# Patient Record
Sex: FEMALE | Race: ASIAN | URBAN - METROPOLITAN AREA
[De-identification: names, ages, dates, MRNs, and addresses within clinical notes are randomized per-mention and may not be internally consistent; named-entity substitution may affect disease eponyms.]

---

## 2017-03-30 ENCOUNTER — DOCTOR'S OFFICE (OUTPATIENT)
Dept: URBAN - METROPOLITAN AREA CLINIC 135 | Facility: CLINIC | Age: 37
Setting detail: OPHTHALMOLOGY
End: 2017-03-30
Payer: COMMERCIAL

## 2017-03-30 DIAGNOSIS — H52.223: ICD-10-CM

## 2017-03-30 DIAGNOSIS — H52.13: ICD-10-CM

## 2017-03-30 DIAGNOSIS — H44.23: ICD-10-CM

## 2017-03-30 PROCEDURE — 92004 COMPRE OPH EXAM NEW PT 1/>: CPT | Performed by: OPTOMETRIST

## 2017-03-30 ASSESSMENT — REFRACTION_AUTOREFRACTION
OS_AXIS: 041
OS_SPHERE: -14.75
OD_SPHERE: -16.00
OD_AXIS: 153
OS_CYLINDER: -1.25
OD_CYLINDER: -0.75

## 2017-03-30 ASSESSMENT — REFRACTION_OUTSIDERX
OD_AXIS: 155
OS_VA3: 20/
OD_VA1: 20/50
OD_SPHERE: -17.50
OS_VA1: 20/40
OS_VA2: 20/20
OS_SPHERE: -17.25
OU_VA: 20/40
OD_CYLINDER: -0.75
OD_VA3: 20/
OS_CYLINDER: -1.00
OS_AXIS: 020
OD_VA2: 20/20

## 2017-03-30 ASSESSMENT — REFRACTION_CURRENTRX
OS_OVR_VA: 20/
OD_OVR_VA: 20/
OD_OVR_VA: 20/
OS_OVR_VA: 20/
OS_OVR_VA: 20/
OD_OVR_VA: 20/

## 2017-03-30 ASSESSMENT — REFRACTION_MANIFEST
OD_VA2: 20/
OS_VA3: 20/
OD_VA1: 20/
OS_VA3: 20/
OD_VA2: 20/
OD_VA1: 20/
OS_VA1: 20/
OU_VA: 20/
OS_VA1: 20/
OS_VA2: 20/
OD_VA3: 20/
OU_VA: 20/
OS_VA2: 20/
OD_VA3: 20/

## 2017-03-30 ASSESSMENT — TEAR BREAK UP TIME (TBUT)
OS_TBUT: 1+
OD_TBUT: 1+

## 2017-03-30 ASSESSMENT — CONFRONTATIONAL VISUAL FIELD TEST (CVF)
OS_FINDINGS: FULL
OD_FINDINGS: FULL

## 2017-03-30 ASSESSMENT — SPHEQUIV_DERIVED
OS_SPHEQUIV: -15.375
OD_SPHEQUIV: -16.375

## 2017-03-30 ASSESSMENT — VISUAL ACUITY
OS_BCVA: 20/70
OD_BCVA: 20/150

## 2017-03-30 ASSESSMENT — SUPERFICIAL PUNCTATE KERATITIS (SPK)
OS_SPK: T
OD_SPK: T

## 2017-05-18 ENCOUNTER — DOCTOR'S OFFICE (OUTPATIENT)
Dept: URBAN - METROPOLITAN AREA CLINIC 135 | Facility: CLINIC | Age: 37
Setting detail: OPHTHALMOLOGY
End: 2017-05-18
Payer: COMMERCIAL

## 2017-05-18 DIAGNOSIS — H52.223: ICD-10-CM

## 2017-05-18 DIAGNOSIS — H52.13: ICD-10-CM

## 2017-05-18 PROCEDURE — 92310 CONTACT LENS FITTING OU: CPT | Performed by: OPTOMETRIST

## 2017-05-18 ASSESSMENT — REFRACTION_AUTOREFRACTION
OS_AXIS: 041
OD_CYLINDER: -0.75
OD_AXIS: 153
OS_SPHERE: -14.75
OD_SPHERE: -16.00
OS_CYLINDER: -1.25

## 2017-05-18 ASSESSMENT — REFRACTION_OUTSIDERX
OU_VA: 20/40
OD_CYLINDER: -0.75
OS_VA1: 20/40
OD_VA1: 20/50
OS_CYLINDER: -1.00
OD_VA2: 20/20
OD_AXIS: 155
OS_VA2: 20/20
OS_VA3: 20/
OS_SPHERE: -17.25
OS_AXIS: 020
OD_SPHERE: -17.50
OD_VA3: 20/

## 2017-05-18 ASSESSMENT — REFRACTION_CURRENTRX
OD_OVR_VA: 20/
OS_OVR_VA: 20/
OD_OVR_VA: 20/
OD_OVR_VA: 20/
OS_OVR_VA: 20/
OS_OVR_VA: 20/

## 2017-05-18 ASSESSMENT — REFRACTION_MANIFEST
OD_VA3: 20/
OD_VA2: 20/
OD_VA2: 20/
OS_VA1: 20/
OS_VA2: 20/
OU_VA: 20/
OS_VA3: 20/
OS_VA2: 20/
OD_VA1: 20/
OD_VA3: 20/
OS_VA3: 20/
OS_VA1: 20/
OD_VA1: 20/
OU_VA: 20/

## 2017-05-18 ASSESSMENT — TEAR BREAK UP TIME (TBUT)
OS_TBUT: 1+
OD_TBUT: 1+

## 2017-05-18 ASSESSMENT — SUPERFICIAL PUNCTATE KERATITIS (SPK)
OD_SPK: T
OS_SPK: T

## 2017-05-18 ASSESSMENT — SPHEQUIV_DERIVED
OS_SPHEQUIV: -15.375
OD_SPHEQUIV: -16.375

## 2017-05-18 ASSESSMENT — VISUAL ACUITY
OD_BCVA: 20/40+2
OS_BCVA: 20/50+1

## 2017-10-30 LAB
ABO + RH BLD: NORMAL
D AG BLD QL: NORMAL
EXTERNAL RUBELLA IGG QUANTITATION: NORMAL
HBV SURFACE AG SER QL: NORMAL
HIV 1+2 AB+HIV1 P24 AG SERPL QL IA: NONREACTIVE
RPR SER QL: NORMAL

## 2017-12-27 LAB — GLUCOSE 1H P GLC SERPL-MCNC: 111 MG/DL (ref 51–180)

## 2018-03-06 ENCOUNTER — HOSPITAL ENCOUNTER (OUTPATIENT)
Dept: PERINATAL CARE | Facility: HOSPITAL | Age: 38
Discharge: HOME | End: 2018-03-06
Attending: OBSTETRICS & GYNECOLOGY
Payer: COMMERCIAL

## 2018-03-06 ENCOUNTER — TRANSCRIBE ORDERS (OUTPATIENT)
Dept: SCHEDULING | Age: 38
End: 2018-03-06

## 2018-03-06 DIAGNOSIS — O09.523 ELDERLY MULTIGRAVIDA IN THIRD TRIMESTER: ICD-10-CM

## 2018-03-06 DIAGNOSIS — Z36.9 PRENATAL SCREENING ENCOUNTER: Primary | ICD-10-CM

## 2018-03-06 DIAGNOSIS — O46.90 VAGINAL BLEEDING IN PREGNANCY: Primary | ICD-10-CM

## 2018-03-06 DIAGNOSIS — Z36.9 PRENATAL SCREENING ENCOUNTER: ICD-10-CM

## 2018-03-06 DIAGNOSIS — Z3A.32 32 WEEKS GESTATION OF PREGNANCY: ICD-10-CM

## 2018-03-06 PROCEDURE — 76817 TRANSVAGINAL US OBSTETRIC: CPT | Mod: TC

## 2018-03-06 PROCEDURE — 76805 OB US >/= 14 WKS SNGL FETUS: CPT | Mod: TC

## 2018-03-15 ENCOUNTER — HOSPITAL ENCOUNTER (OUTPATIENT)
Facility: HOSPITAL | Age: 38
Discharge: HOME | End: 2018-03-15
Attending: OBSTETRICS & GYNECOLOGY | Admitting: OBSTETRICS & GYNECOLOGY
Payer: COMMERCIAL

## 2018-03-15 VITALS
WEIGHT: 160 LBS | SYSTOLIC BLOOD PRESSURE: 106 MMHG | HEART RATE: 87 BPM | BODY MASS INDEX: 26.66 KG/M2 | DIASTOLIC BLOOD PRESSURE: 55 MMHG | TEMPERATURE: 98.3 F | HEIGHT: 65 IN

## 2018-03-15 PROBLEM — O47.00 PRETERM CONTRACTIONS: Status: ACTIVE | Noted: 2018-03-15

## 2018-03-15 PROBLEM — O46.90 VAGINAL BLEEDING IN PREGNANCY: Status: RESOLVED | Noted: 2018-03-06 | Resolved: 2018-03-15

## 2018-03-15 LAB
BACTERIA URNS QL MICRO: NORMAL /UL
BILIRUB UR QL STRIP.AUTO: NEGATIVE MG/DL
CLARITY UR REFRACT.AUTO: CLEAR
COLOR UR AUTO: YELLOW
GLUCOSE UR STRIP.AUTO-MCNC: NEGATIVE MG/DL
HGB UR QL STRIP.AUTO: NEGATIVE
HYALINE CASTS #/AREA URNS LPF: NORMAL /LPF
KETONES UR STRIP.AUTO-MCNC: NEGATIVE MG/DL
LEUKOCYTE ESTERASE UR QL STRIP.AUTO: NEGATIVE
NITRITE UR QL STRIP.AUTO: NEGATIVE
PH UR STRIP.AUTO: 6.5 [PH]
PROT UR QL STRIP.AUTO: NEGATIVE
RBC #/AREA URNS HPF: NORMAL /HPF
SP GR UR REFRACT.AUTO: <=1.005
SQUAMOUS URNS QL MICRO: NORMAL /HPF
UROBILINOGEN UR STRIP-ACNC: 0.2 EU/DL
WBC #/AREA URNS HPF: NORMAL /HPF

## 2018-03-15 PROCEDURE — 81003 URINALYSIS AUTO W/O SCOPE: CPT | Performed by: OBSTETRICS & GYNECOLOGY

## 2018-03-15 PROCEDURE — 87086 URINE CULTURE/COLONY COUNT: CPT | Performed by: OBSTETRICS & GYNECOLOGY

## 2018-03-15 PROCEDURE — 63600000 HC DRUGS/DETAIL CODE

## 2018-03-15 PROCEDURE — 63600000 HC DRUGS/DETAIL CODE: Performed by: OBSTETRICS & GYNECOLOGY

## 2018-03-15 RX ORDER — TERBUTALINE SULFATE 1 MG/ML
0.25 INJECTION SUBCUTANEOUS ONCE
Status: COMPLETED | OUTPATIENT
Start: 2018-03-15 | End: 2018-03-15

## 2018-03-15 RX ORDER — TERBUTALINE SULFATE 1 MG/ML
INJECTION SUBCUTANEOUS
Status: COMPLETED
Start: 2018-03-15 | End: 2018-03-15

## 2018-03-15 RX ADMIN — TERBUTALINE SULFATE 0.25 MG: 1 INJECTION SUBCUTANEOUS at 02:55

## 2018-03-15 RX ADMIN — TERBUTALINE SULFATE 0.25 MG: 1 INJECTION, SOLUTION SUBCUTANEOUS at 02:55

## 2018-03-15 RX ADMIN — TERBUTALINE SULFATE 0.25 MG: 1 INJECTION, SOLUTION SUBCUTANEOUS at 06:25

## 2018-03-15 RX ADMIN — SODIUM CHLORIDE, POTASSIUM CHLORIDE, SODIUM LACTATE AND CALCIUM CHLORIDE 1000 ML: 600; 310; 30; 20 INJECTION, SOLUTION INTRAVENOUS at 02:53

## 2018-03-15 NOTE — H&P
Obstetrics H&P     HPI      Ghazala Palomino is a 37 y.o. female  at 34w0d with an estimated due date of 2018, by Last Menstrual Period who presents with contractions every 4 to 7 minutes, di/di twins, cervix was 1 cm a weeks ago, not painful. Received 1 dose of sq Terb with contractions spacing out to 7 to 12 minutes and feeling the contractions less.      OB History:   Obstetric History       T2      L2     SAB1   TAB0   Ectopic0   Multiple0   Live Births2       # Outcome Date GA Lbr Godfrey/2nd Weight Sex Delivery Anes PTL Lv   4 Current            3 Term 08/21/15     CS-LTranv   CESARIO   2 SAB 06/15/14     SAB      1 Term 12     CS-LTranv   CESARIO      Complications: Maternal Fever          Medical History: No past medical history on file.    Surgical History: No past surgical history on file.    Social History:   Social History     Social History   • Marital status: Single     Spouse name: N/A   • Number of children: N/A   • Years of education: N/A     Social History Main Topics   • Smoking status: Never Smoker   • Smokeless tobacco: Never Used   • Alcohol use No   • Drug use: No   • Sexual activity: Not Asked     Other Topics Concern   • None     Social History Narrative   • None        Family History: No family history on file.    Allergies: Penicillins    Prior to Admission medications    Medication Sig Start Date End Date Taking? Authorizing Provider   PRENATAL VITS96/IRON FUM/FOLIC (PRE-RISHI VITAMIN) 27 mg iron- 800 mcg tablet Take 1 tablet by mouth daily.   Yes Historical Provider, MD       Review of Systems  Pertinent items are noted in HPI.    Objective     Vital Signs for the last 24 hours:  Temp:  [36.9 °C (98.5 °F)] 36.9 °C (98.5 °F)  Heart Rate:  [77-88] 88  BP: ()/(54-58) 99/54    Latest cervical exam:  Cervical Dilation (cm): 1  Cervical Effacement: 60  Fetal Station: -2  Method: sterile exam per RN (03/15/18 7350)          Fetal Monitoring:  FHR Baseline: normal  FHR  Variability: moderate  FHR Accelerations: present  FHR Decelerations: absent    Contraction Frequency: q 7 to 12 minutes      Labs:  No results found for: ABO, LABRH, RUBELLAIGGQT, GBS   Urine analysis nl    Assessment/Plan     Ghazala Palomino is a 37 y.o. female  at 34w0d admitted for  contractions,  prior caesarian section    1) FHR: Category I  2) GBS: non applicable  3) 2nd dose of sq Terb    Zaina Victor MD  3/15/2018  6:48 AM

## 2018-03-15 NOTE — DISCHARGE INSTRUCTIONS
Discharge Instructions    Call the office if you experience:    · Your bag of water breaks.  · Your baby stops moving.  · You have contractions or severe cramping every 5-7 minutes.   · You have any bleeding from your vagina.  · You have a low, dull backache. The pain may be felt in your lower back, or move to  · your sides or front.  · You have a fever higher than 100.4º.   · You have persistent headaches despite trying tylenol, spots in your vision, abdominal pain    Unless otherwise specified, keep your scheduled appointment. The office phone number is [phone].

## 2018-03-16 LAB — BACTERIA UR CULT: NORMAL

## 2018-03-28 LAB — GP B STREP SPEC QL CULT: NO GROWTH

## 2018-04-07 ENCOUNTER — HOSPITAL ENCOUNTER (OUTPATIENT)
Facility: HOSPITAL | Age: 38
Discharge: HOME | End: 2018-04-08
Attending: OBSTETRICS & GYNECOLOGY | Admitting: OBSTETRICS & GYNECOLOGY
Payer: COMMERCIAL

## 2018-04-08 VITALS
WEIGHT: 165 LBS | HEIGHT: 65 IN | TEMPERATURE: 98.4 F | RESPIRATION RATE: 16 BRPM | SYSTOLIC BLOOD PRESSURE: 97 MMHG | BODY MASS INDEX: 27.49 KG/M2 | HEART RATE: 84 BPM | DIASTOLIC BLOOD PRESSURE: 54 MMHG

## 2018-04-08 PROCEDURE — 63600000 HC DRUGS/DETAIL CODE: Performed by: OBSTETRICS & GYNECOLOGY

## 2018-04-08 PROCEDURE — 59025 FETAL NON-STRESS TEST: CPT

## 2018-04-08 RX ORDER — SODIUM CHLORIDE, SODIUM LACTATE, POTASSIUM CHLORIDE, CALCIUM CHLORIDE 600; 310; 30; 20 MG/100ML; MG/100ML; MG/100ML; MG/100ML
INJECTION, SOLUTION INTRAVENOUS CONTINUOUS
Status: DISCONTINUED | OUTPATIENT
Start: 2018-04-08 | End: 2018-04-08 | Stop reason: HOSPADM

## 2018-04-08 RX ORDER — TERBUTALINE SULFATE 1 MG/ML
0.25 INJECTION SUBCUTANEOUS ONCE
Status: COMPLETED | OUTPATIENT
Start: 2018-04-08 | End: 2018-04-08

## 2018-04-08 RX ADMIN — SODIUM CHLORIDE, POTASSIUM CHLORIDE, SODIUM LACTATE AND CALCIUM CHLORIDE: 600; 310; 30; 20 INJECTION, SOLUTION INTRAVENOUS at 00:54

## 2018-04-08 RX ADMIN — TERBUTALINE SULFATE 0.25 MG: 1 INJECTION, SOLUTION SUBCUTANEOUS at 03:06

## 2018-04-08 RX ADMIN — TERBUTALINE SULFATE 0.25 MG: 1 INJECTION, SOLUTION SUBCUTANEOUS at 00:14

## 2018-04-08 RX ADMIN — SODIUM CHLORIDE, POTASSIUM CHLORIDE, SODIUM LACTATE AND CALCIUM CHLORIDE 1000 ML: 600; 310; 30; 20 INJECTION, SOLUTION INTRAVENOUS at 00:14

## 2018-04-08 NOTE — NURSING NOTE
Spoke to DR Victor. Pt may have another dose of terbutaline now if she feels contractions again. Pt may be discharged to home when contractions space apart and pt comfortable.

## 2018-04-08 NOTE — NURSING NOTE
Pt was sleeping comfortable, awakened when nurse entered room. Contractions spaced out since she received her second dose of terbutaline. Pt given option to stay in hospital until morning or go home now since she is comfortable. Pt opt to be discharged now. IV d/c'd (pt received a total of 2 liters of LR).   Pt notified to call MD if contractions become more frequent or more intense again, or if water breaks. Pt verblized understanding.

## 2018-04-11 ENCOUNTER — ANESTHESIA EVENT (INPATIENT)
Dept: OBSTETRICS AND GYNECOLOGY | Facility: HOSPITAL | Age: 38
End: 2018-04-11
Payer: COMMERCIAL

## 2018-04-11 ENCOUNTER — HOSPITAL ENCOUNTER (INPATIENT)
Facility: HOSPITAL | Age: 38
LOS: 3 days | Discharge: HOME | End: 2018-04-14
Attending: OBSTETRICS & GYNECOLOGY | Admitting: OBSTETRICS & GYNECOLOGY
Payer: COMMERCIAL

## 2018-04-11 ENCOUNTER — ANESTHESIA (INPATIENT)
Dept: OBSTETRICS AND GYNECOLOGY | Facility: HOSPITAL | Age: 38
End: 2018-04-11
Payer: COMMERCIAL

## 2018-04-11 DIAGNOSIS — Z01.818 TUBAL LIGATION EVALUATION: ICD-10-CM

## 2018-04-11 DIAGNOSIS — O34.219 PREVIOUS CESAREAN DELIVERY AFFECTING PREGNANCY: Primary | ICD-10-CM

## 2018-04-11 PROBLEM — O47.00 PRETERM CONTRACTIONS: Status: RESOLVED | Noted: 2018-03-15 | Resolved: 2018-04-11

## 2018-04-11 PROBLEM — O09.523 ELDERLY MULTIGRAVIDA IN THIRD TRIMESTER: Status: RESOLVED | Noted: 2018-03-06 | Resolved: 2018-04-11

## 2018-04-11 PROBLEM — Z3A.32 32 WEEKS GESTATION OF PREGNANCY: Status: RESOLVED | Noted: 2018-03-06 | Resolved: 2018-04-11

## 2018-04-11 PROBLEM — Z98.51 S/P TUBAL LIGATION: Status: ACTIVE | Noted: 2018-04-11

## 2018-04-11 LAB
ABO + RH BLD: NORMAL
ALBUMIN SERPL-MCNC: 2.9 G/DL (ref 3.4–5)
ALP SERPL-CCNC: 185 IU/L (ref 35–126)
ALT SERPL-CCNC: 15 IU/L (ref 11–54)
AST SERPL-CCNC: 24 IU/L (ref 15–41)
BASOPHILS # BLD: 0.03 K/UL (ref 0.01–0.1)
BASOPHILS NFR BLD: 0.4 %
BILIRUB DIRECT SERPL-MCNC: <0.1 MG/DL
BILIRUB SERPL-MCNC: 0.4 MG/DL (ref 0.3–1.2)
BLD GP AB SCN SERPL QL: NEGATIVE
D AG BLD QL: POSITIVE
DIFFERENTIAL METHOD BLD: NORMAL
EOSINOPHIL # BLD: 0.13 K/UL (ref 0.04–0.36)
EOSINOPHIL NFR BLD: 1.6 %
ERYTHROCYTE [DISTWIDTH] IN BLOOD BY AUTOMATED COUNT: 13.7 % (ref 11.7–14.4)
HCT VFR BLDCO AUTO: 33.4 % (ref 35–45)
HGB BLD-MCNC: 10.3 G/DL (ref 11.8–15.7)
IMM GRANULOCYTES # BLD AUTO: 0.07 K/UL (ref 0–0.08)
IMM GRANULOCYTES NFR BLD AUTO: 0.8 %
LABORATORY COMMENT REPORT: NORMAL
LYMPHOCYTES # BLD: 1.73 K/UL (ref 1.2–3.5)
LYMPHOCYTES NFR BLD: 20.7 %
MCH RBC QN AUTO: 24.8 PG (ref 28–33.2)
MCHC RBC AUTO-ENTMCNC: 30.8 G/DL (ref 32.2–35.5)
MCV RBC AUTO: 80.5 FL (ref 83–98)
MONOCYTES # BLD: 0.65 K/UL (ref 0.28–0.8)
MONOCYTES NFR BLD: 7.8 %
NEUTROPHILS # BLD: 5.75 K/UL (ref 1.7–7)
NEUTS SEG NFR BLD: 68.7 %
NRBC BLD-RTO: 0 %
PDW BLD AUTO: 11.7 FL (ref 9.4–12.3)
PLATELET # BLD AUTO: 212 K/UL (ref 150–369)
PROT SERPL-MCNC: 5.9 G/DL (ref 6–8.2)
RBC # BLD AUTO: 4.15 M/UL (ref 3.93–5.22)
RPR SER QL: NORMAL
WBC # BLD AUTO: 8.36 K/UL (ref 3.8–10.5)

## 2018-04-11 PROCEDURE — 63600000 HC DRUGS/DETAIL CODE: Performed by: NURSE ANESTHETIST, CERTIFIED REGISTERED

## 2018-04-11 PROCEDURE — 12000000 HC ROOM AND CARE MED/SURG

## 2018-04-11 PROCEDURE — 63600000 HC DRUGS/DETAIL CODE: Performed by: OBSTETRICS & GYNECOLOGY

## 2018-04-11 PROCEDURE — 63600000 HC DRUGS/DETAIL CODE

## 2018-04-11 PROCEDURE — 25000000 HC PHARMACY GENERAL: Performed by: OBSTETRICS & GYNECOLOGY

## 2018-04-11 PROCEDURE — 71000001 HC PACU PHASE 1 INITIAL 30MIN: Performed by: OBSTETRICS & GYNECOLOGY

## 2018-04-11 PROCEDURE — 0UB70ZZ EXCISION OF BILATERAL FALLOPIAN TUBES, OPEN APPROACH: ICD-10-PCS | Performed by: OBSTETRICS & GYNECOLOGY

## 2018-04-11 PROCEDURE — 25000000 HC PHARMACY GENERAL: Performed by: NURSE ANESTHETIST, CERTIFIED REGISTERED

## 2018-04-11 PROCEDURE — 86850 RBC ANTIBODY SCREEN: CPT

## 2018-04-11 PROCEDURE — 85025 COMPLETE CBC W/AUTO DIFF WBC: CPT | Performed by: OBSTETRICS & GYNECOLOGY

## 2018-04-11 PROCEDURE — 37000010 ANESTHESIA SPINAL BLOCK: Performed by: ANESTHESIOLOGY

## 2018-04-11 PROCEDURE — 80076 HEPATIC FUNCTION PANEL: CPT | Performed by: OBSTETRICS & GYNECOLOGY

## 2018-04-11 PROCEDURE — 37000010 HC ANESTHESIA SPINAL: Performed by: OBSTETRICS & GYNECOLOGY

## 2018-04-11 PROCEDURE — 88302 TISSUE EXAM BY PATHOLOGIST: CPT | Performed by: OBSTETRICS & GYNECOLOGY

## 2018-04-11 PROCEDURE — 86592 SYPHILIS TEST NON-TREP QUAL: CPT | Performed by: OBSTETRICS & GYNECOLOGY

## 2018-04-11 PROCEDURE — 72000021 HC C SECTION LEVEL 1: Performed by: OBSTETRICS & GYNECOLOGY

## 2018-04-11 PROCEDURE — 71000011 HC PACU PHASE 1 EA ADDL MIN: Performed by: OBSTETRICS & GYNECOLOGY

## 2018-04-11 PROCEDURE — 63700000 HC SELF-ADMINISTRABLE DRUG: Performed by: OBSTETRICS & GYNECOLOGY

## 2018-04-11 PROCEDURE — 36415 COLL VENOUS BLD VENIPUNCTURE: CPT | Performed by: OBSTETRICS & GYNECOLOGY

## 2018-04-11 PROCEDURE — 63700000 HC SELF-ADMINISTRABLE DRUG

## 2018-04-11 RX ORDER — CALCIUM CARBONATE 200(500)MG
500 TABLET,CHEWABLE ORAL EVERY 4 HOURS PRN
Status: DISCONTINUED | OUTPATIENT
Start: 2018-04-11 | End: 2018-04-14 | Stop reason: HOSPADM

## 2018-04-11 RX ORDER — DIBUCAINE 1 %
1 OINTMENT (GRAM) TOPICAL AS NEEDED
Status: DISCONTINUED | OUTPATIENT
Start: 2018-04-11 | End: 2018-04-14 | Stop reason: HOSPADM

## 2018-04-11 RX ORDER — CEFAZOLIN SODIUM/WATER 1 G/10 ML
SYRINGE (ML) INTRAVENOUS
Status: COMPLETED
Start: 2018-04-11 | End: 2018-04-11

## 2018-04-11 RX ORDER — AMOXICILLIN 250 MG
1 CAPSULE ORAL 2 TIMES DAILY
Status: DISCONTINUED | OUTPATIENT
Start: 2018-04-11 | End: 2018-04-14 | Stop reason: HOSPADM

## 2018-04-11 RX ORDER — ONDANSETRON 8 MG/1
TABLET, ORALLY DISINTEGRATING ORAL
Status: COMPLETED
Start: 2018-04-11 | End: 2018-04-11

## 2018-04-11 RX ORDER — OXYTOCIN/RINGER'S LACTATE 20/1000 ML
125 PLASTIC BAG, INJECTION (ML) INTRAVENOUS CONTINUOUS
Status: DISPENSED | OUTPATIENT
Start: 2018-04-11 | End: 2018-04-11

## 2018-04-11 RX ORDER — BUPIVACAINE HYDROCHLORIDE 7.5 MG/ML
INJECTION, SOLUTION INTRASPINAL AS NEEDED
Status: DISCONTINUED | OUTPATIENT
Start: 2018-04-11 | End: 2018-04-11 | Stop reason: SURG

## 2018-04-11 RX ORDER — ONDANSETRON 4 MG/1
4 TABLET, ORALLY DISINTEGRATING ORAL EVERY 6 HOURS PRN
Status: DISCONTINUED | OUTPATIENT
Start: 2018-04-11 | End: 2018-04-14 | Stop reason: HOSPADM

## 2018-04-11 RX ORDER — ACETAMINOPHEN 325 MG/1
650 TABLET ORAL EVERY 4 HOURS PRN
Status: DISCONTINUED | OUTPATIENT
Start: 2018-04-11 | End: 2018-04-11

## 2018-04-11 RX ORDER — KETOROLAC TROMETHAMINE 30 MG/ML
30 INJECTION, SOLUTION INTRAMUSCULAR; INTRAVENOUS
Status: COMPLETED | OUTPATIENT
Start: 2018-04-11 | End: 2018-04-13

## 2018-04-11 RX ORDER — SODIUM CITRATE AND CITRIC ACID MONOHYDRATE 334; 500 MG/5ML; MG/5ML
SOLUTION ORAL
Status: COMPLETED
Start: 2018-04-11 | End: 2018-04-11

## 2018-04-11 RX ORDER — DIPHENHYDRAMINE HYDROCHLORIDE 50 MG/ML
12.5 INJECTION INTRAMUSCULAR; INTRAVENOUS EVERY 6 HOURS PRN
Status: DISCONTINUED | OUTPATIENT
Start: 2018-04-11 | End: 2018-04-14 | Stop reason: HOSPADM

## 2018-04-11 RX ORDER — LANOLIN
1 WAX (GRAM) MISCELLANEOUS AS NEEDED
Status: DISCONTINUED | OUTPATIENT
Start: 2018-04-11 | End: 2018-04-11 | Stop reason: SDUPTHER

## 2018-04-11 RX ORDER — NALOXONE HYDROCHLORIDE 0.4 MG/ML
0.4 INJECTION, SOLUTION INTRAMUSCULAR; INTRAVENOUS; SUBCUTANEOUS AS NEEDED
Status: ACTIVE | OUTPATIENT
Start: 2018-04-11 | End: 2018-04-12

## 2018-04-11 RX ORDER — ACETAMINOPHEN 325 MG/1
TABLET ORAL
Status: DISPENSED
Start: 2018-04-11 | End: 2018-04-11

## 2018-04-11 RX ORDER — PROCHLORPERAZINE EDISYLATE 5 MG/ML
10 INJECTION INTRAMUSCULAR; INTRAVENOUS EVERY 6 HOURS PRN
Status: DISCONTINUED | OUTPATIENT
Start: 2018-04-11 | End: 2018-04-14 | Stop reason: HOSPADM

## 2018-04-11 RX ORDER — SODIUM CHLORIDE, SODIUM LACTATE, POTASSIUM CHLORIDE, CALCIUM CHLORIDE 600; 310; 30; 20 MG/100ML; MG/100ML; MG/100ML; MG/100ML
150 INJECTION, SOLUTION INTRAVENOUS CONTINUOUS
Status: DISCONTINUED | OUTPATIENT
Start: 2018-04-11 | End: 2018-04-11

## 2018-04-11 RX ORDER — OXYCODONE HYDROCHLORIDE 5 MG/1
5 TABLET ORAL EVERY 4 HOURS PRN
Status: DISCONTINUED | OUTPATIENT
Start: 2018-04-11 | End: 2018-04-14 | Stop reason: HOSPADM

## 2018-04-11 RX ORDER — TERBUTALINE SULFATE 1 MG/ML
0.25 INJECTION SUBCUTANEOUS ONCE
Status: COMPLETED | OUTPATIENT
Start: 2018-04-11 | End: 2018-04-11

## 2018-04-11 RX ORDER — MORPHINE SULFATE 0.5 MG/ML
INJECTION, SOLUTION EPIDURAL; INTRATHECAL; INTRAVENOUS AS NEEDED
Status: DISCONTINUED | OUTPATIENT
Start: 2018-04-11 | End: 2018-04-11 | Stop reason: SURG

## 2018-04-11 RX ORDER — ACETAMINOPHEN 325 MG/1
975 TABLET ORAL EVERY 6 HOURS
Status: DISCONTINUED | OUTPATIENT
Start: 2018-04-11 | End: 2018-04-14 | Stop reason: HOSPADM

## 2018-04-11 RX ORDER — SODIUM CHLORIDE, SODIUM LACTATE, POTASSIUM CHLORIDE, CALCIUM CHLORIDE 600; 310; 30; 20 MG/100ML; MG/100ML; MG/100ML; MG/100ML
INJECTION, SOLUTION INTRAVENOUS CONTINUOUS
Status: DISCONTINUED | OUTPATIENT
Start: 2018-04-11 | End: 2018-04-11

## 2018-04-11 RX ORDER — PHENYLEPHRINE HYDROCHLORIDE 10 MG/ML
INJECTION INTRAVENOUS AS NEEDED
Status: DISCONTINUED | OUTPATIENT
Start: 2018-04-11 | End: 2018-04-11 | Stop reason: SURG

## 2018-04-11 RX ORDER — NALOXONE HYDROCHLORIDE 0.4 MG/ML
0.1 INJECTION, SOLUTION INTRAMUSCULAR; INTRAVENOUS; SUBCUTANEOUS
Status: DISCONTINUED | OUTPATIENT
Start: 2018-04-11 | End: 2018-04-14 | Stop reason: HOSPADM

## 2018-04-11 RX ORDER — DIPHENHYDRAMINE HCL 25 MG
25 CAPSULE ORAL EVERY 6 HOURS PRN
Status: DISCONTINUED | OUTPATIENT
Start: 2018-04-11 | End: 2018-04-14 | Stop reason: HOSPADM

## 2018-04-11 RX ORDER — ONDANSETRON HYDROCHLORIDE 2 MG/ML
4 INJECTION, SOLUTION INTRAVENOUS EVERY 6 HOURS PRN
Status: DISCONTINUED | OUTPATIENT
Start: 2018-07-10 | End: 2018-04-14 | Stop reason: HOSPADM

## 2018-04-11 RX ORDER — SODIUM CITRATE AND CITRIC ACID MONOHYDRATE 334; 500 MG/5ML; MG/5ML
30 SOLUTION ORAL ONCE
Status: COMPLETED | OUTPATIENT
Start: 2018-04-11 | End: 2018-04-11

## 2018-04-11 RX ORDER — ONDANSETRON HYDROCHLORIDE 2 MG/ML
INJECTION, SOLUTION INTRAVENOUS AS NEEDED
Status: DISCONTINUED | OUTPATIENT
Start: 2018-04-11 | End: 2018-04-11 | Stop reason: SURG

## 2018-04-11 RX ORDER — OXYTOCIN/RINGER'S LACTATE 20/1000 ML
PLASTIC BAG, INJECTION (ML) INTRAVENOUS AS NEEDED
Status: DISCONTINUED | OUTPATIENT
Start: 2018-04-11 | End: 2018-04-11 | Stop reason: SURG

## 2018-04-11 RX ORDER — LANOLIN
1 WAX (GRAM) MISCELLANEOUS AS NEEDED
Status: DISCONTINUED | OUTPATIENT
Start: 2018-04-11 | End: 2018-04-14 | Stop reason: HOSPADM

## 2018-04-11 RX ORDER — CEFAZOLIN SODIUM/WATER 1 G/10 ML
2 SYRINGE (ML) INTRAVENOUS
Status: DISCONTINUED | OUTPATIENT
Start: 2018-04-11 | End: 2018-04-11 | Stop reason: HOSPADM

## 2018-04-11 RX ORDER — ONDANSETRON 8 MG/1
8 TABLET, ORALLY DISINTEGRATING ORAL ONCE
Status: COMPLETED | OUTPATIENT
Start: 2018-04-11 | End: 2018-04-11

## 2018-04-11 RX ORDER — ACETAMINOPHEN 325 MG/1
975 TABLET ORAL EVERY 4 HOURS PRN
Status: DISCONTINUED | OUTPATIENT
Start: 2018-04-11 | End: 2018-04-11

## 2018-04-11 RX ORDER — ALUMINUM HYDROXIDE, MAGNESIUM HYDROXIDE, AND SIMETHICONE 1200; 120; 1200 MG/30ML; MG/30ML; MG/30ML
30 SUSPENSION ORAL EVERY 4 HOURS PRN
Status: DISCONTINUED | OUTPATIENT
Start: 2018-04-11 | End: 2018-04-14 | Stop reason: HOSPADM

## 2018-04-11 RX ORDER — SODIUM CHLORIDE, SODIUM LACTATE, POTASSIUM CHLORIDE, CALCIUM CHLORIDE 600; 310; 30; 20 MG/100ML; MG/100ML; MG/100ML; MG/100ML
125 INJECTION, SOLUTION INTRAVENOUS CONTINUOUS
Status: ACTIVE | OUTPATIENT
Start: 2018-04-11 | End: 2018-04-12

## 2018-04-11 RX ORDER — OXYTOCIN 10 [USP'U]/ML
INJECTION, SOLUTION INTRAMUSCULAR; INTRAVENOUS AS NEEDED
Status: DISCONTINUED | OUTPATIENT
Start: 2018-04-11 | End: 2018-04-11 | Stop reason: SURG

## 2018-04-11 RX ORDER — IBUPROFEN 600 MG/1
600 TABLET ORAL EVERY 6 HOURS PRN
Status: DISCONTINUED | OUTPATIENT
Start: 2018-04-13 | End: 2018-04-14 | Stop reason: HOSPADM

## 2018-04-11 RX ADMIN — KETOROLAC TROMETHAMINE 30 MG: 30 INJECTION, SOLUTION INTRAMUSCULAR; INTRAVENOUS at 10:34

## 2018-04-11 RX ADMIN — KETOROLAC TROMETHAMINE 30 MG: 30 INJECTION, SOLUTION INTRAMUSCULAR; INTRAVENOUS at 16:37

## 2018-04-11 RX ADMIN — SODIUM CITRATE AND CITRIC ACID MONOHYDRATE 30 ML: 500; 334 SOLUTION ORAL at 02:38

## 2018-04-11 RX ADMIN — SODIUM CHLORIDE, POTASSIUM CHLORIDE, SODIUM LACTATE AND CALCIUM CHLORIDE: 600; 310; 30; 20 INJECTION, SOLUTION INTRAVENOUS at 01:45

## 2018-04-11 RX ADMIN — TERBUTALINE SULFATE 0.25 MG: 1 INJECTION, SOLUTION SUBCUTANEOUS at 01:30

## 2018-04-11 RX ADMIN — SENNOSIDES AND DOCUSATE SODIUM 1 TABLET: 8.6; 5 TABLET ORAL at 20:20

## 2018-04-11 RX ADMIN — ONDANSETRON 8 MG: 8 TABLET, ORALLY DISINTEGRATING ORAL at 02:39

## 2018-04-11 RX ADMIN — PHENYLEPHRINE HYDROCHLORIDE 200 MCG: 10 INJECTION INTRAVENOUS at 03:33

## 2018-04-11 RX ADMIN — ACETAMINOPHEN 975 MG: 325 TABLET, FILM COATED ORAL at 16:36

## 2018-04-11 RX ADMIN — Medication 125 ML/HR: at 07:00

## 2018-04-11 RX ADMIN — PHENYLEPHRINE HYDROCHLORIDE 200 MCG: 10 INJECTION INTRAVENOUS at 03:28

## 2018-04-11 RX ADMIN — CEFAZOLIN SODIUM 2 MG: 100 INJECTION, SOLUTION INTRAVENOUS at 02:44

## 2018-04-11 RX ADMIN — SODIUM CITRATE AND CITRIC ACID MONOHYDRATE 30 ML: 334; 500 SOLUTION ORAL at 02:38

## 2018-04-11 RX ADMIN — KETOROLAC TROMETHAMINE 30 MG: 30 INJECTION, SOLUTION INTRAMUSCULAR; INTRAVENOUS at 22:21

## 2018-04-11 RX ADMIN — ONDANSETRON 4 MG: 2 INJECTION INTRAMUSCULAR; INTRAVENOUS at 03:11

## 2018-04-11 RX ADMIN — Medication 2 MG: at 02:44

## 2018-04-11 RX ADMIN — SODIUM CHLORIDE, POTASSIUM CHLORIDE, SODIUM LACTATE AND CALCIUM CHLORIDE: 600; 310; 30; 20 INJECTION, SOLUTION INTRAVENOUS at 01:33

## 2018-04-11 RX ADMIN — PRENATAL VIT W/ FE FUMARATE-FA TAB 27-0.8 MG 1 TABLET: 27-0.8 TAB at 08:53

## 2018-04-11 RX ADMIN — ONDANSETRON 2 MG: 2 INJECTION INTRAMUSCULAR; INTRAVENOUS at 03:05

## 2018-04-11 RX ADMIN — MORPHINE SULFATE 0.15 MG: 0.5 INJECTION, SOLUTION EPIDURAL; INTRATHECAL; INTRAVENOUS at 02:54

## 2018-04-11 RX ADMIN — ACETAMINOPHEN 975 MG: 325 TABLET, FILM COATED ORAL at 22:21

## 2018-04-11 RX ADMIN — SENNOSIDES AND DOCUSATE SODIUM 1 TABLET: 8.6; 5 TABLET ORAL at 08:53

## 2018-04-11 RX ADMIN — OXYTOCIN 5 UNITS: 10 INJECTION, SOLUTION INTRAMUSCULAR; INTRAVENOUS at 03:12

## 2018-04-11 RX ADMIN — PHENYLEPHRINE HYDROCHLORIDE 200 MCG: 10 INJECTION INTRAVENOUS at 03:23

## 2018-04-11 RX ADMIN — Medication 1000 ML: at 03:23

## 2018-04-11 RX ADMIN — Medication 1000 ML: at 03:12

## 2018-04-11 RX ADMIN — BUPIVACAINE HYDROCHLORIDE IN DEXTROSE 1.6 ML: 7.5 INJECTION, SOLUTION SUBARACHNOID at 02:54

## 2018-04-11 RX ADMIN — PHENYLEPHRINE HYDROCHLORIDE 200 MCG: 10 INJECTION INTRAVENOUS at 03:19

## 2018-04-11 RX ADMIN — SODIUM CHLORIDE, POTASSIUM CHLORIDE, SODIUM LACTATE AND CALCIUM CHLORIDE 125 ML/HR: 600; 310; 30; 20 INJECTION, SOLUTION INTRAVENOUS at 11:50

## 2018-04-11 RX ADMIN — KETOROLAC TROMETHAMINE 30 MG: 30 INJECTION, SOLUTION INTRAMUSCULAR; INTRAVENOUS at 04:25

## 2018-04-11 RX ADMIN — ACETAMINOPHEN 975 MG: 325 TABLET, FILM COATED ORAL at 10:34

## 2018-04-11 ASSESSMENT — PAIN SCALES - GENERAL: PAIN_LEVEL: 0

## 2018-04-11 NOTE — ANESTHESIA PROCEDURE NOTES
Spinal Block    Patient location during procedure: OB  Start time: 4/11/2018 2:55 AM  Staffing  Anesthesiologist: SHAI WING  Resident/CRNA: ALIREZA ALVAREZ  Preanesthetic Checklist  Completed: patient identified, surgical consent, pre-op evaluation, timeout performed, IV checked, risks and benefits discussed and monitors and equipment checked  Spinal Block  Patient position: sitting  Prep: ChloraPrep  Patient monitoring: continuous pulse ox  Approach: midline  Location: L3-4  Injection technique: single-shot  Needle  Needle type: Sprotte   Needle gauge: 24 G  Needle length: 3.5 in  Assessment  Sensory level: T4  Events: cerebrospinal fluid

## 2018-04-11 NOTE — H&P
Labor and Delivery Admission History and Physical    CC: Previous c section labor    History: Ghazala Palomino is a 37 y.o. female  at 37w6d with an estimated due date of 2018, by Last Menstrual Period who presents with contractions      Prenatal problems:  1. none    OB History:   Obstetric History       T2      L2     SAB1   TAB0   Ectopic0   Multiple0   Live Births2       # Outcome Date GA Lbr Godfrey/2nd Weight Sex Delivery Anes PTL Lv   4 Current            3 Term 08/21/15     CS-LTranv   CESARIO   2 SAB 06/15/14     SAB      1 Term 12     CS-LTranv   CESARIO      Complications: Maternal Fever          Medical History:   Past Medical History:   Diagnosis Date   • Infectious viral hepatitis     chronic hep B       Surgical History:   Past Surgical History:   Procedure Laterality Date   •  SECTION  12 and 8/21/15   • KNEE SURGERY Left        Social History:   Social History     Social History   • Marital status:      Spouse name: Abhijeet   • Number of children: N/A   • Years of education: N/A     Occupational History   • marketing      Social History Main Topics   • Smoking status: Never Smoker   • Smokeless tobacco: Never Used   • Alcohol use No   • Drug use: No   • Sexual activity: Yes     Other Topics Concern   • None     Social History Narrative   • None        Family History:   Family History   Problem Relation Age of Onset   • No Known Problems Paternal Grandfather    • No Known Problems Paternal Grandmother    • No Known Problems Maternal Grandmother    • No Known Problems Maternal Grandfather    • No Known Problems Father    • No Known Problems Mother    • No Known Problems Brother    • No Known Problems Sister        Allergies: Penicillins    Prior to Admission medications    Medication Sig Start Date End Date Taking? Authorizing Provider   PRENATAL VITS96/IRON FUM/FOLIC (PRE- VITAMIN) 27 mg iron- 800 mcg tablet Take 1 tablet by mouth daily.   Yes Historical  MD Julito       Review of Systems  Pertinent items are noted in HPI.    Objective     Vital Signs for the last 24 hours:  Temp:  [36.9 °C (98.5 °F)] 36.9 °C (98.5 °F)  Heart Rate:  [82] 82  Resp:  [16] 16  BP: (133)/(77) 133/77    Latest cervical exam:  Cervical Dilation (cm): 3-4  Cervical Effacement: 90  Fetal Station: -2  Method: sterile exam per RN (18 0057)      Fetal Monitoring:  EFM: baseline 140, mod hemal, pos accels, no decels  [place]: q3    Exam:  General Appearance: Alert, cooperative, no acute distress  Lungs: Unlabored breathing  Heart:: RRR  Abdomen: gravid, nontender  Extremities: no edema or calf tenderness  Neurologic: grossly intact without focal deficits    Cervix:4/-2    Ultrasounds:   I have reviewed the applicable Ultrasounds.      Labs:  See results console for full labs    Rubella: immune  Rh: pos  GBS: neg    Assessment/Plan     Ghazala Palomino is a 37 y.o. female  at 37w6d admitted for  labor     FHR: Category I  GBS: negative    Abigail Atkins MD

## 2018-04-11 NOTE — PLAN OF CARE
Problem: Patient Care Overview  Goal: Individualization & Mutuality  Outcome: Ongoing (interventions implemented as appropriate)

## 2018-04-11 NOTE — PLAN OF CARE
Problem: Patient Care Overview  Goal: Plan of Care Review  Outcome: Ongoing (interventions implemented as appropriate)   04/11/18 0651   Coping/Psychosocial   Plan Of Care Reviewed With patient   Plan of Care Review   Progress improving   Outcome Summary Pt pain is 0 out of 10     Goal: Individualization & Mutuality  Outcome: Ongoing (interventions implemented as appropriate)

## 2018-04-11 NOTE — OP NOTE
OB  Delivery OP Note    Date of Procedure: 2018  Patient:Ghazala Palomino  :1980    Procedures:    *  SECTION AND POST PARTUM TUBAL LIGATION  Review the Delivery Report for details.      Details    Pre-Op Diagnosis: 1. 37 y.o.  Intrauterine pregnancy at 37w6d  2. Labor  3. Desires permanent sterilization   Post-Op Diagnosis: 1. same   Procedure: Repeat  , Low Transverse  via   uterine incision and   skin incision.  Bilateral Mid segmental salpingectomy   Anesthesia:      Findings: Normal uterus, tubes, and ovaries.   EBL  1700 mL   Complications: None     Specimen Information:  ID Type Source Tests Collected by Time Destination   1 : right tube Tissue Fallopian Tube, Right PATHOLOGY TISSUE EXAM Abigail Atkins MD 2018 0323    2 : left tube Tissue Fallopian Tube, Left PATHOLOGY TISSUE EXAM Abigail Atkins MD 2018 0320      Drains: Riley draining clear    Staff:  Surgeon(s):  Abigail Atkins MD  Anesthesiologist: Rafal Logan MD  CRNA: Gay Ventura CRNA   Circulator: Earline Vásquez RN  Scrub Person: Ghazala Del Rio  Baby Nurse: Sally Jones RN  Other Staff:     Delivery Assist;Delivery Nurse     INFANT INFORMATION  Time of Birth:3:11 AM   Presentation:     Position:Right ,Occiput ,Transverse ,    Cord:   ,Complications:    Kincaid Sex: male   Kincaid Weight: 3.232 kg      1 Minute 5 Minute 10 Minute   Apgar Totals:                 Information for the patient's :  Neli Palomino [[id number]]      Cord Gas     None          Informed Consent:  An informed consent was obtained.    Procedure Details:  The patient was taken to the operating room where    anesthesia was placed and found to be adequate. Antibiotics were given for infection prophylaxis. The patient was prepped and draped in the normal sterile fashion.  A timeout was performed.  A Pfannenstiel skin incision was made with the scalpel. The  incision was carried down to the fascia using the bovie. The fascia was incised and this was extended laterally with the bovie. The fascia was grasped with Kocher clamps and the underlying rectus muscle was dissected off.  The rectus muscles were  bluntly. The peritoneum was identified and entered sharply. The peritoneum was dissected to allow for adequate visualization.    The bladder blade was inserted. The vesicouterine peritoneum was identified and a bladder flap created sharply. The lower uterine segment was then incised with a    incision and was extended bluntly. The amniotic sac was ruptured and    fluid noted. The bladder blade was removed and the infant's head was delivered atraumatically using the usual maneuvers. The remainder of the infant was delivered, a spontaneous cry was heard, and the infant appeared to be moving all 4 extremities. The cord clamped and cut, and the baby was handed off to the awaiting clinicians and neonatology staff.  The placenta was removed manually. The uterus was then exteriorized and cleared of all clots and debris. The hysterotomy was repaired with 0 PDS in a running locked fashion and good hemostasis observed. The ovaries and tubes appeared normal bilaterally. The left tube was grasped with a nathen. A hole was made in the mesosalpinx with bovie cautery.  A 2 cm segment of fallopian tube was tied off using a 2-0 chromic suture.  The tube was dissected using Metzenbaum scissors.  The tubal ends were cauterized using Bovie cautery.  The procedure was repeated on the right.  Suture came off of the distal segment on the right.  This was grasped with a Spurgeon.  Bleeding was noted.  The tube was retied using a 2-0 chromic suture.  We did several running locking sutures along the mesosalpinx.  Good hemostasis was noted.  The hysterotomy incision was reexamined and irrigated.  There was some bleeding from the right corner.  This was made hemostatic using an 0 Monocryl  suture.  Good hemostasis was noted at the hysterotomy site and the tubes were reinspected noted to be hemostatic.  The uterus was placed back into the abdomen.  The gutters were irrigated using moist laps.  The tubal segments were rechecked and noted to be hemostatic.  The hysterotomy was noted to be hemostatic.  The rectus muscle bellies were reapproximated with interrupted horizontal mattress sutures of 3-0 chromic.  The muscle bellies were noted to be hemostatic. .The fascia was then reapproximated with a running suture of 0 PDS.  Subcutaneous tissue was reapproximated using interrupted sutures of 3 oh plain gut.  The skin was closed with 4-0 Monocryl.    The patient tolerated the procedure well. The sponge, instrument, and needle counts were correct and the patient was taken to recovery in stable condition.    Attending Attestation: I was present and scrubbed for the entire procedure.    Abigail Atkins MD

## 2018-04-11 NOTE — PLAN OF CARE
Problem: Postpartum ( Delivery) (Adult,Obstetrics,Pediatric)  Goal: Anesthesia/Sedation Recovery  Outcome: Outcome(s) Achieved Date Met: 18

## 2018-04-11 NOTE — PLAN OF CARE
Problem: Pressure Ulcer Risk (Jose Scale) (Adult,Obstetrics,Pediatric)  Goal: Skin Integrity  Outcome: Ongoing (interventions implemented as appropriate)

## 2018-04-11 NOTE — ANESTHESIA POSTPROCEDURE EVALUATION
Patient: Ghazala Palomino    Procedure Summary     Date:  18 Room / Location:   L&D OR    Anesthesia Start:  246 Anesthesia Stop:  358    Procedure:   SECTION AND POST PARTUM TUBAL LIGATION (Bilateral Uterus) Diagnosis:  (Other (Add Comments))    Surgeon:  Abigail Atkins MD Responsible Provider:  Rafal Logan MD    Anesthesia Type:  spinal ASA Status:  2          Anesthesia Type: spinal  PACU Vitals     No data found.            Anesthesia Post Evaluation    Pain score: 0  Pain management: adequate  Patient location during evaluation: PACU  Patient participation: complete - patient participated  Level of consciousness: awake and alert  Cardiovascular status: acceptable  Respiratory status: acceptable  Hydration status: acceptable  Continue 24 hours observation after preservative free morphine administration: Yes  Anesthetic complications: no

## 2018-04-11 NOTE — ANESTHESIA PREPROCEDURE EVALUATION
Anesthesia ROS/MED HX    Anesthesia History    Previous anesthetics  No family history of anesthetic complications  No history of anesthetic complications  Pulmonary - neg  Neuro/Psych - neg  Cardiovascular- neg  GI/Hepatic   Hepatitis  Endo/Other   Liver disease   Body Habitus: Overweight      Relevant Problems   No active problems are marked relevant to this note.       Physical Exam    Airway   Mallampati: II   TM distance: >3 FB   Neck ROM: full  Cardiovascular - normal   Rhythm: regular   Rate: normal  Pulmonary - normal   clear to auscultation  Other Findings   Back - neg   landmarks identified          Anesthesia Plan    Plan: spinal    Technique: spinal   ASA 2  Blood Products:     Consented to blood products  Anesthetic plan and risks discussed with: spouse  Postop Plan:   Patient Disposition: inpatient floor planned admission

## 2018-04-11 NOTE — PLAN OF CARE
Problem: Patient Care Overview  Goal: Plan of Care Review  Outcome: Ongoing (interventions implemented as appropriate)   04/11/18 0651 04/11/18 7443   Coping/Psychosocial   Plan Of Care Reviewed With --  patient;spouse   Plan of Care Review   Progress --  improving   Outcome Summary Pt pain is 0 out of 10 --

## 2018-04-11 NOTE — NURSING NOTE
IV saline locked. SCD removed bilaterally. Patient assisted out of bed and ambulated to bathroom without any dizziness. Voided 450mL yellow urine. pericare provided. Pt then ambulated to rocking chair, where she sat to eat dinner.

## 2018-04-11 NOTE — PROGRESS NOTES
Patient: Ghazala Palomino  Procedure(s):   SECTION AND POST PARTUM TUBAL LIGATION  Patient location: Labor and Delivery    Last vitals:   Vitals:    18 0427   BP: (!) 91/44   Pulse: 80   Resp:    Temp:    SpO2:      Level of consciousness: Awake, Alert, Oriented  Post-anesthesia pain: Adequate analgesia  Anesthetic complications: None  Nausea and Vomiting Controled: Yes  Hydration: Adequate  Cardiovascular Function: Stable  Neuro Exam: WNL  Respiration: WNL  Pain is well controlled after spinal preservative free morphine administration and additional IV/PO meds:  Continue 24 hours observation after preservative free morphine administration:

## 2018-04-11 NOTE — PLAN OF CARE
Problem: Pressure Ulcer Risk (Jose Scale) (Adult,Obstetrics,Pediatric)  Goal: Identify Related Risk Factors and Signs and Symptoms  Outcome: Ongoing (interventions implemented as appropriate)

## 2018-04-12 LAB
CASE RPRT: NORMAL
CLINICAL INFO: NORMAL
ERYTHROCYTE [DISTWIDTH] IN BLOOD BY AUTOMATED COUNT: 14.1 % (ref 11.7–14.4)
HCT VFR BLDCO AUTO: 24.8 % (ref 35–45)
HGB BLD-MCNC: 7.8 G/DL (ref 11.8–15.7)
MCH RBC QN AUTO: 25.3 PG (ref 28–33.2)
MCHC RBC AUTO-ENTMCNC: 31.5 G/DL (ref 32.2–35.5)
MCV RBC AUTO: 80.5 FL (ref 83–98)
PATH REPORT.FINAL DX SPEC: NORMAL
PATH REPORT.GROSS SPEC: NORMAL
PDW BLD AUTO: 11.5 FL (ref 9.4–12.3)
PLATELET # BLD AUTO: 192 K/UL (ref 150–369)
RBC # BLD AUTO: 3.08 M/UL (ref 3.93–5.22)
WBC # BLD AUTO: 11.86 K/UL (ref 3.8–10.5)

## 2018-04-12 PROCEDURE — 12000000 HC ROOM AND CARE MED/SURG

## 2018-04-12 PROCEDURE — 85027 COMPLETE CBC AUTOMATED: CPT | Performed by: OBSTETRICS & GYNECOLOGY

## 2018-04-12 PROCEDURE — 63700000 HC SELF-ADMINISTRABLE DRUG: Performed by: OBSTETRICS & GYNECOLOGY

## 2018-04-12 PROCEDURE — 63600000 HC DRUGS/DETAIL CODE: Performed by: OBSTETRICS & GYNECOLOGY

## 2018-04-12 PROCEDURE — 36415 COLL VENOUS BLD VENIPUNCTURE: CPT | Performed by: OBSTETRICS & GYNECOLOGY

## 2018-04-12 RX ADMIN — KETOROLAC TROMETHAMINE 30 MG: 30 INJECTION, SOLUTION INTRAMUSCULAR; INTRAVENOUS at 04:25

## 2018-04-12 RX ADMIN — SENNOSIDES AND DOCUSATE SODIUM 1 TABLET: 8.6; 5 TABLET ORAL at 10:17

## 2018-04-12 RX ADMIN — KETOROLAC TROMETHAMINE 30 MG: 30 INJECTION, SOLUTION INTRAMUSCULAR; INTRAVENOUS at 22:38

## 2018-04-12 RX ADMIN — ACETAMINOPHEN 975 MG: 325 TABLET, FILM COATED ORAL at 16:23

## 2018-04-12 RX ADMIN — KETOROLAC TROMETHAMINE 30 MG: 30 INJECTION, SOLUTION INTRAMUSCULAR; INTRAVENOUS at 16:22

## 2018-04-12 RX ADMIN — SENNOSIDES AND DOCUSATE SODIUM 1 TABLET: 8.6; 5 TABLET ORAL at 20:05

## 2018-04-12 RX ADMIN — ACETAMINOPHEN 975 MG: 325 TABLET, FILM COATED ORAL at 04:24

## 2018-04-12 RX ADMIN — ACETAMINOPHEN 975 MG: 325 TABLET, FILM COATED ORAL at 10:22

## 2018-04-12 RX ADMIN — ACETAMINOPHEN 975 MG: 325 TABLET, FILM COATED ORAL at 22:38

## 2018-04-12 RX ADMIN — KETOROLAC TROMETHAMINE 30 MG: 30 INJECTION, SOLUTION INTRAMUSCULAR; INTRAVENOUS at 10:27

## 2018-04-12 RX ADMIN — PRENATAL VIT W/ FE FUMARATE-FA TAB 27-0.8 MG 1 TABLET: 27-0.8 TAB at 10:16

## 2018-04-12 NOTE — PROGRESS NOTES
Obstetrics Postpartum Progress Note  POD#1 s/p rLTCS    Events  No acute events overnight.    Subjective  Pain: Appropriate, controlled  Bleeding: lochia minimal  Diet: taking regular diet  Voiding: without difficulty  Bowel: passing flatus  Ambulating: as tolerated    Vitals  Temp:  [36.6 °C (97.8 °F)-37 °C (98.6 °F)] 36.9 °C (98.5 °F)  Heart Rate:  [70-88] 79  Resp:  [16-20] 16  BP: ()/(56-64) 94/56    I&O    Intake/Output Summary (Last 24 hours) at 18 0939  Last data filed at 18 1852   Gross per 24 hour   Intake              950 ml   Output             1450 ml   Net             -500 ml       Physical Exam  General: well  Lungs: Unlabored breathing  Abdomen: soft, minimally TTP without r/g. Min distension and tympany  Fundus: firm and below umbilicus  Incision: clean, dry, intact  Perineum: deferred  Extremities: no edema    Labs  Labs Reviewed:  Lab Results   Component Value Date    ABO A 2018    LABRH Positive 2018      Rubella: immune  Lab Results   Component Value Date    WBC 11.86 (H) 2018    HGB 7.8 (L) 2018    HCT 24.8 (L) 2018    MCV 80.5 (L) 2018     2018       Problem-based Assessment and Plan    Ghazala Palomino is a 37 y.o.  postop day 1 s/p RLTCS    1. Vital Signs: stable  2. Hemodynamics: stable, UOP is excellent. Asymptomatic anemia, started at 10. Iron on dc.  3. Pain: controlled  4. VTE Assessment: Early Ambulation, SCDs  5. Vaccinations/Rhogam: rhogam not indicated   6. Post care: meeting all goals     Plan: Routine care; anticipate discharge on POD#3    Dilcia Lomeli MD

## 2018-04-12 NOTE — LACTATION NOTE
4/12 P3, Mom reports baby is bf well but her will only nurse on one side a feeding and I told her that is perfectly normal and that may change as the appetite really kicks in, output and weight WNL.

## 2018-04-12 NOTE — ANESTHESIA PREPROCEDURE EVALUATION
Anesthesia ROS/MED HX    Anesthesia History - neg  Pulmonary - neg  Neuro/Psych - neg  Cardiovascular- neg  GI/Hepatic   Hepatitis  Musculoskeletal- neg  Endo/Other   Liver disease   Body Habitus: Overweight      Past Surgical History:   Procedure Laterality Date   •  SECTION  12 and 8/21/15   • KNEE SURGERY Left        Physical Exam    Airway   Mallampati: II   TM distance: >3 FB   Neck ROM: full  Cardiovascular - normal   Rhythm: regular   Rate: normal  Pulmonary - normal   clear to auscultation  Dental - normal        Anesthesia Plan    Plan: epidural    Technique: epidural   ASA 2  Blood Products:     Consented to blood products  Anesthetic plan and risks discussed with: patient  Postop Plan:   Patient Disposition: inpatient floor planned admission   Pain Management: IV analgesics

## 2018-04-12 NOTE — PLAN OF CARE
Problem: Patient Care Overview  Goal: Individualization & Mutuality  Outcome: Outcome(s) Achieved Date Met: 04/12/18

## 2018-04-12 NOTE — PLAN OF CARE
Problem: Patient Care Overview  Goal: Plan of Care Review  Outcome: Ongoing (interventions implemented as appropriate)   04/12/18 5910   Coping/Psychosocial   Plan Of Care Reviewed With patient   Plan of Care Review   Progress improving   Outcome Summary OOB ambulating,caring for and feeding infant independently

## 2018-04-12 NOTE — PLAN OF CARE
Problem: Patient Care Overview  Goal: Individualization & Mutuality  Outcome: Ongoing (interventions implemented as appropriate)   04/11/18 1742   Individualization   Patient Specific Preferences exclusively breastfeed   Patient Specific Goals breastfeed only   Patient Specific Interventions assist with positioning and latch-on.

## 2018-04-12 NOTE — ADDENDUM NOTE
Addendum  created 04/12/18 1326 by Rafal Logan MD    Anesthesia Review and Sign - Ready for Procedure, Anesthesia Review and Sign - Signed, Problem List reviewed, Sign clinical note

## 2018-04-12 NOTE — PLAN OF CARE
Problem: Pressure Ulcer Risk (Jose Scale) (Adult,Obstetrics,Pediatric)  Goal: Identify Related Risk Factors and Signs and Symptoms  Outcome: Outcome(s) Achieved Date Met: 04/12/18    Goal: Skin Integrity  Outcome: Outcome(s) Achieved Date Met: 04/12/18

## 2018-04-13 PROBLEM — O34.219 PREVIOUS CESAREAN DELIVERY AFFECTING PREGNANCY: Status: RESOLVED | Noted: 2018-04-08 | Resolved: 2018-04-13

## 2018-04-13 PROBLEM — O34.219 PREVIOUS CESAREAN DELIVERY AFFECTING PREGNANCY: Status: ACTIVE | Noted: 2018-04-08

## 2018-04-13 PROCEDURE — 63700000 HC SELF-ADMINISTRABLE DRUG: Performed by: OBSTETRICS & GYNECOLOGY

## 2018-04-13 PROCEDURE — 63600000 HC DRUGS/DETAIL CODE: Performed by: OBSTETRICS & GYNECOLOGY

## 2018-04-13 PROCEDURE — 12000000 HC ROOM AND CARE MED/SURG

## 2018-04-13 RX ORDER — ACETAMINOPHEN 325 MG/1
975 TABLET ORAL EVERY 6 HOURS
Qty: 360 TABLET | Refills: 0 | Status: SHIPPED | OUTPATIENT
Start: 2018-04-13 | End: 2018-05-13

## 2018-04-13 RX ORDER — IBUPROFEN 600 MG/1
600 TABLET ORAL EVERY 6 HOURS PRN
Start: 2018-04-13 | End: 2018-05-13

## 2018-04-13 RX ORDER — OXYCODONE HYDROCHLORIDE 5 MG/1
5 TABLET ORAL EVERY 4 HOURS PRN
Refills: 0
Start: 2018-04-13 | End: 2018-04-18

## 2018-04-13 RX ADMIN — ACETAMINOPHEN 975 MG: 325 TABLET, FILM COATED ORAL at 10:45

## 2018-04-13 RX ADMIN — ACETAMINOPHEN 975 MG: 325 TABLET, FILM COATED ORAL at 04:29

## 2018-04-13 RX ADMIN — IBUPROFEN 600 MG: 600 TABLET ORAL at 16:44

## 2018-04-13 RX ADMIN — PRENATAL VIT W/ FE FUMARATE-FA TAB 27-0.8 MG 1 TABLET: 27-0.8 TAB at 09:23

## 2018-04-13 RX ADMIN — KETOROLAC TROMETHAMINE 30 MG: 30 INJECTION, SOLUTION INTRAMUSCULAR; INTRAVENOUS at 04:30

## 2018-04-13 RX ADMIN — IBUPROFEN 600 MG: 600 TABLET ORAL at 22:40

## 2018-04-13 RX ADMIN — IBUPROFEN 600 MG: 600 TABLET ORAL at 10:45

## 2018-04-13 RX ADMIN — ACETAMINOPHEN 975 MG: 325 TABLET, FILM COATED ORAL at 16:45

## 2018-04-13 RX ADMIN — SENNOSIDES AND DOCUSATE SODIUM 1 TABLET: 8.6; 5 TABLET ORAL at 09:23

## 2018-04-13 RX ADMIN — ACETAMINOPHEN 975 MG: 325 TABLET, FILM COATED ORAL at 22:40

## 2018-04-13 NOTE — PLAN OF CARE
Problem: Patient Care Overview  Goal: Plan of Care Review  Outcome: Ongoing (interventions implemented as appropriate)   18 0109   Coping/Psychosocial   Plan Of Care Reviewed With patient   Plan of Care Review   Progress progress toward functional goals as expected     Goal: Individualization & Mutuality  Outcome: Ongoing (interventions implemented as appropriate)    Goal: Discharge Needs Assessment  Outcome: Ongoing (interventions implemented as appropriate)    Goal: Interprofessional Rounds/Family Conf  Outcome: Ongoing (interventions implemented as appropriate)      Problem: Postpartum ( Delivery) (Adult,Obstetrics,Pediatric)  Goal: Signs and Symptoms of Listed Potential Problems Will be Absent, Minimized or Managed (Postpartum)  Outcome: Ongoing (interventions implemented as appropriate)

## 2018-04-13 NOTE — PROGRESS NOTES
Obstetrics Postpartum Progress Note    Events  No acute events overnight.    Subjective  Pain: no  Bleeding: lochia minimal  Diet: taking regular diet  Voiding: without difficulty  Bowel: passing flatus  Ambulating: as tolerated  Feeding: plans to breastfeed    Vitals  Temp:  [36.2 °C (97.2 °F)-37.1 °C (98.7 °F)] 36.6 °C (97.9 °F)  Heart Rate:  [72-84] 72  Resp:  [18] 18  BP: ()/(51-64) 105/64    I&O  No intake or output data in the 24 hours ending 18 0956    Physical Exam  General: well  Abdomen: soft, nondistended, non-tender  Fundus: firm and at umbilicus  Incision: not applicable, (vaginal delivery)  Extremities: no edema    Labs  Labs Reviewed:  Lab Results   Component Value Date    ABO A 2018    LABRH Positive 2018     Assessment/Plan   Problem-based Assessment and Plan    Ghazala Palomino is a 37 y.o.  postpartum day 2 s/p , Low Transverse .    Appropriate postpartum course. Continue routine postpartum care.  Anticipate discharge home on 2018. Reviewed discharge instructions, precautions.      Darby Ackerman MD

## 2018-04-13 NOTE — DISCHARGE SUMMARY
Inpatient Discharge Summary    BRIEF OVERVIEW  Admitting Provider: Abigail Atkins MD  Discharge Provider: Zaina Victor MD  Primary Care Physician at Discharge: Abigail Atkins [phone]     Admission Date: 2018     Discharge Date: 2018    Primary Discharge Diagnosis   delivery delivered    Secondary Discharge Diagnosis      Discharge Disposition  Home     Discharge Medications     Medication List      START taking these medications    acetaminophen 325 mg tablet  Commonly known as:  TYLENOL  Take 3 tablets (975 mg total) by mouth every 6 (six) hours.     ibuprofen 600 mg tablet  Commonly known as:  MOTRIN  Take 1 tablet (600 mg total) by mouth every 6 (six) hours as needed for mild pain.     oxyCODONE 5 mg immediate release tablet  Commonly known as:  ROXICODONE  Take 1 tablet (5 mg total) by mouth every 4 (four) hours as needed for moderate pain for up to 5 days.           Where to Get Your Medications      These medications were sent to [facility] [place] [address] [place] - [address] [facility]  [address] [place] [id number]    Phone:  [phone]   · acetaminophen 325 mg tablet     Information about where to get these medications is not yet available    Ask your nurse or doctor about these medications  · ibuprofen 600 mg tablet  · oxyCODONE 5 mg immediate release tablet         Active Issues Requiring Follow-up    Follow-up Appointments Arranged: Yes     Outpatient Follow-Up  No future appointments.    Test Results Pending at Discharge      DETAILS OF HOSPITAL STAY    Presenting Problem/History of Present Illness   delivery delivered [O82]  Delivery by  section at 37-39 weeks of gestation due to labor [O75.82]      Hospital Course/Operative Procedures Performed  Procedure(s):   SECTION AND POST PARTUM TUBAL LIGATION

## 2018-04-13 NOTE — PLAN OF CARE
Problem: Postpartum ( Delivery) (Adult,Obstetrics,Pediatric)  Goal: Signs and Symptoms of Listed Potential Problems Will be Absent, Minimized or Managed (Postpartum)  Outcome: Adequate for Discharge

## 2018-04-14 VITALS
TEMPERATURE: 96.5 F | SYSTOLIC BLOOD PRESSURE: 106 MMHG | HEIGHT: 65 IN | HEART RATE: 70 BPM | RESPIRATION RATE: 18 BRPM | DIASTOLIC BLOOD PRESSURE: 72 MMHG | OXYGEN SATURATION: 100 % | BODY MASS INDEX: 27.32 KG/M2 | WEIGHT: 164 LBS

## 2018-04-14 PROCEDURE — 63700000 HC SELF-ADMINISTRABLE DRUG: Performed by: OBSTETRICS & GYNECOLOGY

## 2018-04-14 RX ADMIN — IBUPROFEN 600 MG: 600 TABLET ORAL at 10:13

## 2018-04-14 RX ADMIN — ACETAMINOPHEN 975 MG: 325 TABLET, FILM COATED ORAL at 04:37

## 2018-04-14 RX ADMIN — PRENATAL VIT W/ FE FUMARATE-FA TAB 27-0.8 MG 1 TABLET: 27-0.8 TAB at 10:16

## 2018-04-14 RX ADMIN — ACETAMINOPHEN 975 MG: 325 TABLET, FILM COATED ORAL at 10:14

## 2018-04-14 RX ADMIN — IBUPROFEN 600 MG: 600 TABLET ORAL at 04:35

## 2018-04-14 RX ADMIN — SENNOSIDES AND DOCUSATE SODIUM 1 TABLET: 8.6; 5 TABLET ORAL at 10:16

## 2018-04-14 NOTE — LACTATION NOTE
P3. Mom reports breastfeeding is going well. Mom's milk is in and she is feeling engorged. We went over strategies to relieve engorgement. We went over breastfeeding booklet, specifically pages 19-20 (engorgement info), and mom is aware of lactation resources available to her after discharge.

## 2018-04-14 NOTE — PLAN OF CARE
Problem: Postpartum ( Delivery) (Adult,Obstetrics,Pediatric)  Goal: Signs and Symptoms of Listed Potential Problems Will be Absent, Minimized or Managed (Postpartum)  Outcome: Ongoing (interventions implemented as appropriate)   18   Postpartum ( Delivery)   Problems Assessed (Postpartum ) all   Problems Present (Postpartum ) pain

## 2018-04-14 NOTE — NURSING NOTE
All teaching and discharge instructions reviewed. Infant secured in carseat by parents. Mom and baby discharged to home in good condition.  Maureen Rosales RN  04/14/18

## 2018-04-20 NOTE — DISCHARGE SUMMARY
Inpatient Discharge Summary    BRIEF OVERVIEW  Admitting Provider: Zaina Victor MD  Discharge Provider: No att. providers found  Primary Care Physician at Discharge: Abigail Atkins [phone]     Admission Date: 2018     Discharge Date: 2018    Primary Discharge Diagnosis  No Principal Problem: There is no principal problem currently on the Problem List. Please update the Problem List and refresh.    Secondary Discharge Diagnosis   contractions, resolved    Discharge Disposition  Home     Discharge Medications     Medication List      No changes were made to your prescriptions during this visit.         Active Issues Requiring Follow-up    Follow-up Appointments Arranged: Yes     Outpatient Follow-Up  No future appointments.    Test Results Pending at Discharge      DETAILS OF HOSPITAL STAY    Presenting Problem/History of Present Illness  No admission diagnoses are documented for this encounter.  Pt with frequent contractions, no cervical change, responsive to sq Terbutaline    Hospital Course/Operative Procedures Performed    Consults: none   Pertinent Test Results: normal

## 2018-05-24 ENCOUNTER — DOCTOR'S OFFICE (OUTPATIENT)
Dept: URBAN - METROPOLITAN AREA CLINIC 135 | Facility: CLINIC | Age: 38
Setting detail: OPHTHALMOLOGY
End: 2018-05-24
Payer: COMMERCIAL

## 2018-05-24 DIAGNOSIS — H52.223: ICD-10-CM

## 2018-05-24 DIAGNOSIS — H52.13: ICD-10-CM

## 2018-05-24 DIAGNOSIS — H44.23: ICD-10-CM

## 2018-05-24 PROCEDURE — 92310 CONTACT LENS FITTING OU: CPT | Performed by: OPTOMETRIST

## 2018-05-24 PROCEDURE — 92014 COMPRE OPH EXAM EST PT 1/>: CPT | Performed by: OPTOMETRIST

## 2018-05-24 ASSESSMENT — REFRACTION_MANIFEST
OS_VA3: 20/
OD_VA1: 20/
OU_VA: 20/
OD_VA3: 20/
OS_VA3: 20/
OS_VA1: 20/
OD_VA2: 20/
OS_VA1: 20/
OD_VA3: 20/
OS_VA2: 20/
OD_VA2: 20/
OD_VA1: 20/
OU_VA: 20/
OS_VA2: 20/

## 2018-05-24 ASSESSMENT — REFRACTION_AUTOREFRACTION
OS_AXIS: 041
OD_SPHERE: -16.00
OS_CYLINDER: -1.25
OD_AXIS: 153
OS_SPHERE: -14.75
OD_CYLINDER: -0.75

## 2018-05-24 ASSESSMENT — REFRACTION_OUTSIDERX
OD_VA2: 20/20
OS_VA3: 20/
OS_CYLINDER: -1.25
OD_VA3: 20/
OS_VA1: 20/25-2
OD_VA1: 20/25-2
OS_SPHERE: -16.00
OD_SPHERE: -16.50
OU_VA: 20/25+1
OS_VA2: 20/20
OS_AXIS: 040
OD_CYLINDER: -1.25
OD_AXIS: 165

## 2018-05-24 ASSESSMENT — SUPERFICIAL PUNCTATE KERATITIS (SPK)
OS_SPK: T
OD_SPK: T

## 2018-05-24 ASSESSMENT — REFRACTION_CURRENTRX
OD_OVR_VA: 20/
OD_OVR_VA: 20/
OS_OVR_VA: 20/
OD_OVR_VA: 20/

## 2018-05-24 ASSESSMENT — SPHEQUIV_DERIVED
OS_SPHEQUIV: -15.375
OD_SPHEQUIV: -16.375

## 2018-05-24 ASSESSMENT — TEAR BREAK UP TIME (TBUT)
OD_TBUT: 1+
OS_TBUT: 1+

## 2018-05-24 ASSESSMENT — CONFRONTATIONAL VISUAL FIELD TEST (CVF)
OD_FINDINGS: FULL
OS_FINDINGS: FULL

## 2018-05-24 ASSESSMENT — VISUAL ACUITY
OS_BCVA: 20/30+1
OD_BCVA: 20/25

## 2020-11-11 NOTE — DISCHARGE INSTR - ACTIVITY
Call MD if contractions get stronger and more frequent. Call if leaking fluids or vaginal bleeding. Drink plenty of fluids and keep bladder empty.   
General

## (undated) DEVICE — SPONGE CURITY GAUZE 4

## (undated) DEVICE — APPLICATOR CHLORAPREP 26ML ORANGE TINT

## (undated) DEVICE — CONTAINER SPECIMEN STERILE 5OZ

## (undated) DEVICE — DRESSING TELFA 3X8

## (undated) DEVICE — STERILE BLOOD COLLECTION TUBES

## (undated) DEVICE — PAD GROUND ELECTROSURGICAL W/CORD

## (undated) DEVICE — PUMP BOTTLE CAVILON FILM BARRIER NO STING

## (undated) DEVICE — DRESSING ABD STERILE 7X8IN

## (undated) DEVICE — PACK C-SECTION RMH

## (undated) DEVICE — ***USE 113445 ***TRAY FOLEY SILVER 16FR 5CC 2 WAY

## (undated) DEVICE — PACK OR TOWEL

## (undated) DEVICE — SYRINGE BULB 60CC

## (undated) DEVICE — PENCIL ESU HANDSWITCHING W/HOL

## (undated) DEVICE — SPONGE LAP DISPOSABLE 18X18

## (undated) DEVICE — ***USE 57698*** SLEEVE FLOWTRON DVT CALF SINGLE USE